# Patient Record
Sex: FEMALE | Race: WHITE
[De-identification: names, ages, dates, MRNs, and addresses within clinical notes are randomized per-mention and may not be internally consistent; named-entity substitution may affect disease eponyms.]

---

## 2017-01-13 ENCOUNTER — HOSPITAL ENCOUNTER (OUTPATIENT)
Dept: HOSPITAL 47 - LABWHC1 | Age: 22
Discharge: HOME | End: 2017-01-13
Payer: COMMERCIAL

## 2017-01-13 DIAGNOSIS — O26.811: Primary | ICD-10-CM

## 2017-01-13 DIAGNOSIS — Z3A.00: ICD-10-CM

## 2017-01-13 LAB
CH: 32.1
CHCM: 36
ERYTHROCYTE [DISTWIDTH] IN BLOOD BY AUTOMATED COUNT: 3.78 M/UL (ref 3.8–5.4)
ERYTHROCYTE [DISTWIDTH] IN BLOOD: 12.4 % (ref 11.5–15.5)
GLUCOSE SERPL-MCNC: 91 MG/DL (ref 74–99)
HCT VFR BLD AUTO: 33.9 % (ref 34–46)
HDW: 2.6
HEPATITIS B SURFACE AG INDEX: 0.06
HGB BLD-MCNC: 11.8 GM/DL (ref 11.4–16)
MCH RBC QN AUTO: 31.1 PG (ref 25–35)
MCHC RBC AUTO-ENTMCNC: 34.8 G/DL (ref 31–37)
MCV RBC AUTO: 89.5 FL (ref 80–100)
NON-AFRICAN AMERICAN GFR(MDRD): >60
WBC # BLD AUTO: 11.6 K/UL (ref 3.8–10.6)

## 2017-01-13 PROCEDURE — 87340 HEPATITIS B SURFACE AG IA: CPT

## 2017-01-13 PROCEDURE — 86762 RUBELLA ANTIBODY: CPT

## 2017-01-13 PROCEDURE — 86778 TOXOPLASMA ANTIBODY IGM: CPT

## 2017-01-13 PROCEDURE — 86850 RBC ANTIBODY SCREEN: CPT

## 2017-01-13 PROCEDURE — 86780 TREPONEMA PALLIDUM: CPT

## 2017-01-13 PROCEDURE — 86696 HERPES SIMPLEX TYPE 2 TEST: CPT

## 2017-01-13 PROCEDURE — 82947 ASSAY GLUCOSE BLOOD QUANT: CPT

## 2017-01-13 PROCEDURE — 36415 COLL VENOUS BLD VENIPUNCTURE: CPT

## 2017-01-13 PROCEDURE — 86901 BLOOD TYPING SEROLOGIC RH(D): CPT

## 2017-01-13 PROCEDURE — 86694 HERPES SIMPLEX NES ANTBDY: CPT

## 2017-01-13 PROCEDURE — 86695 HERPES SIMPLEX TYPE 1 TEST: CPT

## 2017-01-13 PROCEDURE — 86777 TOXOPLASMA ANTIBODY: CPT

## 2017-01-13 PROCEDURE — 85027 COMPLETE CBC AUTOMATED: CPT

## 2017-01-13 PROCEDURE — 86900 BLOOD TYPING SEROLOGIC ABO: CPT

## 2017-01-13 PROCEDURE — 87389 HIV-1 AG W/HIV-1&-2 AB AG IA: CPT

## 2017-01-13 PROCEDURE — 82565 ASSAY OF CREATININE: CPT

## 2017-01-14 LAB
HIV-1/HIV-2 AB SCREEN: (no result)
HIV1D: (no result)
HIV2D: (no result)

## 2017-03-07 ENCOUNTER — HOSPITAL ENCOUNTER (OUTPATIENT)
Dept: HOSPITAL 47 - RADUSWWP | Age: 22
Discharge: HOME | End: 2017-03-07
Payer: COMMERCIAL

## 2017-03-07 DIAGNOSIS — Z3A.19: ICD-10-CM

## 2017-03-07 DIAGNOSIS — O36.62X0: Primary | ICD-10-CM

## 2017-03-07 PROCEDURE — 76811 OB US DETAILED SNGL FETUS: CPT

## 2017-03-08 NOTE — US
EXAMINATION TYPE: US OB fetal anatomy transabd

 

DATE OF EXAM: 3/7/2017 9:35 AM

 

COMPARISON: NONE

 

HISTORY: 036.62XO 2nd tri large for dates

 

TECHNIQUE:

 

EXAM MEASUREMENTS:

 

GESTATIONAL AGE / DATING

Physician Established: (19 weeks/4 days)

** EDC:  7/28/2017

Dates by LMP: Unknown

Dates by First Scan:  Not available

Dates by Current Scan for:  (19 weeks/3 days)

** EDC: 7/29/17

 

FETAL SURVEY

IUP:  Single

PLACENTA: Anterior     

PREVIA: No previa   

** TIANA: 12.20 cm

CERVICAL LENGTH (transabdominal: norm > 3.0cm): 4.8 cm

 

FETAL BIOMETRY

PRESENTATION:   Vertex

FETAL LIE:  Transverse with head maternal Left

BPD: 4.5 cm

**  19 weeks / 5 days

HC: 16.4 cm

**  19 weeks / 1 days

AC: 13.9 cm

**  19 weeks / 3 days

FL: 3.0 cm

**  19 weeks / 2 days

ESTIMATED FETAL WEIGHT IN GRAMS:  284 grams

ESTIMATED FETAL WEIGHT IN LBS/OZS:  0 lbs. 10 oz. 

WEIGHT PERCENTAGE BASED ON ESTABLISHED DATE:  30 %

** HC/AC:  1.2

** FL/AC:   22.

HEART RATE:  144 bpm 

RHYTHM: Normal

 

ANATOMY SEEN (within normal limits): 

* Lateral Vent (< 1 cm) 0.8 cm

* Cisterna Magna (< 1.1 cm) 0.3 cm

* Nuchal Fold (< 0.6 cm) 0.4 cm

* Cerebellum (varies with age) 1.6 cm

Choroid Plexus (bilateral)

Midline Falx 

Cavus Septi Pellucidi   

Four Chamber Heart

Outflow tracts:  LVOT/RVOT

Stomach

Situs

Nose / Lips 

Diaphragm 

Kidneys (bilateral) - prominent renal pelvis 3 mm

Bladder

Cord Insert 

Three Vessel Cord 

Longitudinal Spine 

Transverse Spine 

Arms (bilateral)

Legs (bilateral)

 

 

TECHNOLOGIST IMPRESSION:  Difficult to obtain head structures due to fetal position. 

 

 

 

IMPRESSION:

 

1. Single intrauterine gestation estimated at 19 weeks 3 days gestation. This would've a calculated E
DC of 7/29/2017 based on current measurements.Correlate this with her physician established EDC.

2. Cardiac activity measuring 144 bpm was observed during the study.

## 2017-04-21 ENCOUNTER — HOSPITAL ENCOUNTER (OUTPATIENT)
Dept: HOSPITAL 47 - FBPOP | Age: 22
Discharge: HOME | End: 2017-04-21
Payer: COMMERCIAL

## 2017-04-21 ENCOUNTER — HOSPITAL ENCOUNTER (OUTPATIENT)
Dept: HOSPITAL 47 - LABWHC1 | Age: 22
Discharge: HOME | End: 2017-04-21
Payer: COMMERCIAL

## 2017-04-21 VITALS
RESPIRATION RATE: 16 BRPM | TEMPERATURE: 97.2 F | HEART RATE: 88 BPM | SYSTOLIC BLOOD PRESSURE: 117 MMHG | DIASTOLIC BLOOD PRESSURE: 73 MMHG

## 2017-04-21 DIAGNOSIS — O36.8120: Primary | ICD-10-CM

## 2017-04-21 DIAGNOSIS — Z3A.25: ICD-10-CM

## 2017-04-21 DIAGNOSIS — Z34.92: Primary | ICD-10-CM

## 2017-04-21 DIAGNOSIS — Z3A.00: ICD-10-CM

## 2017-04-21 LAB
CH: 33.3
CHCM: 34
ERYTHROCYTE [DISTWIDTH] IN BLOOD BY AUTOMATED COUNT: 3.2 M/UL (ref 3.8–5.4)
ERYTHROCYTE [DISTWIDTH] IN BLOOD: 13 % (ref 11.5–15.5)
HCT VFR BLD AUTO: 31.6 % (ref 34–46)
HDW: 2.83
HGB BLD-MCNC: 10.7 GM/DL (ref 11.4–16)
MCH RBC QN AUTO: 33.4 PG (ref 25–35)
MCHC RBC AUTO-ENTMCNC: 33.8 G/DL (ref 31–37)
MCV RBC AUTO: 98.7 FL (ref 80–100)
WBC # BLD AUTO: 11.4 K/UL (ref 3.8–10.6)

## 2017-04-21 PROCEDURE — 85027 COMPLETE CBC AUTOMATED: CPT

## 2017-04-21 PROCEDURE — 99213 OFFICE O/P EST LOW 20 MIN: CPT

## 2017-04-21 PROCEDURE — 36415 COLL VENOUS BLD VENIPUNCTURE: CPT

## 2017-04-21 NOTE — P.MSEPDOC
Presenting Problems





- Arrival Data


Date of Arrival on Unit: 17


Time of Arrival on Unit: 15:45


Mode of Transport: Ambulatory





- Complaint


OB-Reason for Admission/Chief Complaint: Decreased Fetal Movement


Comment: x 1.5 days





Prenatal Medical History





- Pregnancy Information


: 3


Para: 0


Term: 0


: 0


Abortions: Spontaneous or Elective: 2


Number of Living Children: 0





- Gestational Age


Expected Date of Delivery: 17


Gestational Age by YOSELIN (wks/days): 25 Weeks and 4 Days





- Prenatal History


Sexually Transmitted Diseases: HSV


Comment: last outbreak 1 month ago, treating as indicated





Review of Systems





- Review of Systems


Constitutional: No problems


Breast: No problems


ENT: No problems


Cardiovascular: No problems


Respiratory: No problems


Gastrointestinal: No problems


Genitourinary: No problems


Musculoskeletal: No problems


Neurological: No problems


Skin: No problems





Vital Signs





- Temperature


Temperature: 97.2 F


Temperature Source: Temporal Artery Scan





- Pulse


  ** Right Sitting Brachial


Pulse Rate: 88


Pulse Assessment Method: Automatic Cuff





- Respirations


Respiratory Rate: 16


Oxygen Delivery Method: Room Air


O2 Sat by Pulse Oximetry: 100





- Blood Pressure


  ** Right Arm Sitting


Blood Pressure: 117/73


Blood Pressure Mean: 87


Blood Pressure Source: Automatic Cuff





Medical Screen Scoring (Pre)





- Cervical Exam


Dilation: Exam Deferred


Effacement: Exam Deferred





- Uterine Contractions


Frequency: N/A


Duration: N/A


Intensity: N/A





- Maternal Vital Signs


Maternal Temperature: N/A


Maternal Blood Pressure: N/A


Signs of Preeclampsia: N/A


Maternal Respirations: N/A





- Maternal Trauma


Maternal Trauma: N/A





- Fetal Assessment


Baseline FHR: 145


Fetal Heart Rate - NICHD Category: Category I (Normal) = 0





- Total Score


Total Score (Pre): 0





- Level of Risk


Level of Risk: Low (0-5)





Physician Notification (Post)





- Physician Notified


Physician Notified Date: 17


Physician Notified Time: 16:10


Physician/Practitioner Notified:: Dr Titus


Spoke With: Dr Titus


New Order Received: Yes





Disposition





- Disposition


OB Disposition: Discharge to home, Written follow up instructions reviewed


Discharge Date: 17


Discharge Time: 16:15


I agree with the RN Medical Screening Exam: Yes


Risk & Benefit of care provided described in d/c instruction: Yes


Diagnosis: DECREASED FETAL MOVEMENTS, SECOND TRIMESTER, FETUS 1

## 2017-06-30 ENCOUNTER — HOSPITAL ENCOUNTER (OUTPATIENT)
Dept: HOSPITAL 47 - FBPOP | Age: 22
Setting detail: OBSERVATION
LOS: 1 days | Discharge: HOME | End: 2017-07-01
Payer: COMMERCIAL

## 2017-06-30 ENCOUNTER — HOSPITAL ENCOUNTER (EMERGENCY)
Dept: HOSPITAL 47 - EC | Age: 22
Discharge: HOME | End: 2017-06-30
Payer: COMMERCIAL

## 2017-06-30 VITALS
RESPIRATION RATE: 20 BRPM | HEART RATE: 100 BPM | TEMPERATURE: 98 F | DIASTOLIC BLOOD PRESSURE: 84 MMHG | SYSTOLIC BLOOD PRESSURE: 123 MMHG

## 2017-06-30 VITALS — BODY MASS INDEX: 34.3 KG/M2

## 2017-06-30 DIAGNOSIS — Z86.14: ICD-10-CM
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
## 2017-06-30 DIAGNOSIS — O9A.213: Primary | ICD-10-CM

## 2017-06-30 DIAGNOSIS — Z82.49: ICD-10-CM

## 2017-06-30 DIAGNOSIS — Z3A.35: ICD-10-CM

## 2017-06-30 DIAGNOSIS — Z79.899: ICD-10-CM

## 2017-06-30 DIAGNOSIS — O26.93: ICD-10-CM

## 2017-06-30 DIAGNOSIS — Z04.1: Primary | ICD-10-CM

## 2017-06-30 DIAGNOSIS — Z3A.36: ICD-10-CM
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
## 2017-06-30 DIAGNOSIS — Z81.8: ICD-10-CM

## 2017-06-30 DIAGNOSIS — O99.89: ICD-10-CM

## 2017-06-30 DIAGNOSIS — S40.012A: ICD-10-CM

## 2017-06-30 DIAGNOSIS — Z87.891: ICD-10-CM

## 2017-06-30 DIAGNOSIS — M25.512: ICD-10-CM

## 2017-06-30 DIAGNOSIS — R10.2: ICD-10-CM

## 2017-06-30 DIAGNOSIS — S46.912A: ICD-10-CM

## 2017-06-30 PROCEDURE — 59025 FETAL NON-STRESS TEST: CPT

## 2017-06-30 PROCEDURE — 99213 OFFICE O/P EST LOW 20 MIN: CPT

## 2017-06-30 PROCEDURE — 99283 EMERGENCY DEPT VISIT LOW MDM: CPT
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
## 2017-07-01 VITALS
SYSTOLIC BLOOD PRESSURE: 120 MMHG | HEART RATE: 104 BPM | TEMPERATURE: 98 F | RESPIRATION RATE: 16 BRPM | DIASTOLIC BLOOD PRESSURE: 79 MMHG
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
## 2017-07-26 ENCOUNTER — HOSPITAL ENCOUNTER (OUTPATIENT)
Dept: HOSPITAL 47 - FBPOP | Age: 22
Discharge: HOME | End: 2017-07-26
Payer: COMMERCIAL

## 2017-07-26 VITALS
TEMPERATURE: 98 F | HEART RATE: 90 BPM | SYSTOLIC BLOOD PRESSURE: 122 MMHG | DIASTOLIC BLOOD PRESSURE: 70 MMHG | RESPIRATION RATE: 16 BRPM

## 2017-07-26 DIAGNOSIS — Z3A.39: ICD-10-CM

## 2017-07-26 DIAGNOSIS — O47.1: Primary | ICD-10-CM

## 2017-07-26 PROCEDURE — 84112 EVAL AMNIOTIC FLUID PROTEIN: CPT

## 2017-07-26 PROCEDURE — 99213 OFFICE O/P EST LOW 20 MIN: CPT

## 2017-07-26 PROCEDURE — 59025 FETAL NON-STRESS TEST: CPT

## 2017-07-27 NOTE — P.MSEPDOC
Presenting Problems





- Arrival Data


Date of Arrival on Unit: 17


Time of Arrival on Unit: 13:00


Mode of Transport: Ambulatory





- Complaint


OB-Reason for Admission/Chief Complaint: Rule Out SROM





Prenatal Medical History





- Pregnancy Information


: 3


Para: 0


Term: 0


: 0


Abortions: Spontaneous or Elective: 2


Number of Living Children: 0





- Gestational Age


Expected Date of Delivery: 17


Gestational Age by YOSELIN (wks/days): 39 Weeks and 6 Days





- Prenatal History


Sexually Transmitted Diseases: HSV





Review of Systems





- Review of Systems


Constitutional: No problems


Breast: No problems


ENT: No problems


Cardiovascular: No problems


Respiratory: No problems


Gastrointestinal: No problems


Genitourinary: No problems


Musculoskeletal: No problems


Neurological: No problems


Skin: No problems





Vital Signs





- Temperature


Temperature: 98.0 F


Temperature Source: Oral





- Pulse


  ** Right Sitting Brachial


Pulse Rate: 90


Pulse Assessment Method: Automatic Cuff





- Respirations


Respiratory Rate: 16


Oxygen Delivery Method: Room Air


O2 Sat by Pulse Oximetry: 97





- Blood Pressure


  ** Right Arm Sitting


Blood Pressure: 122/70


Blood Pressure Mean: 87


Blood Pressure Source: Automatic Cuff





Medical Screen Scoring (Pre)





- Cervical Exam


Dilation: 0 cm = 0


Membranes: Intact





- Uterine Contractions


Frequency: N/A


Duration: N/A


Intensity: N/A





- Maternal Vital Signs


Maternal Temperature: N/A


Maternal Blood Pressure: N/A


Signs of Preeclampsia: N/A


Maternal Respirations: N/A





- Maternal Trauma


Maternal Trauma: N/A





- Fetal Assessment


Baseline FHR: 130


Fetal Heart Rate - NICHD Category: Category I (Normal) = 0


NST: Reactive


Fetal Position: N/A


Fetal Station: N/A





- Total Score


Total Score (Pre): 0





Medical Screen Scoring (Post)





- Cervical Exam


Dilation: 0 cm = 0


Membranes: Intact





- Uterine Contractions


Frequency: > 5 minutes apart = 1


Duration: N/A


Intensity: N/A





- Maternal Vital Signs


Maternal Temperature: N/A


Maternal Blood Pressure: N/A


Signs of Preeclampsia: N/A


Maternal Respirations: N/A





- Maternal Trauma


Maternal Trauma: N/A





- Fetal Assessment


Fetal Heart Rate: 130


Fetal Heart Rate - NICHD Category: Category I (Normal) = 0


NST: Reactive





- Total Score


Total Score (Post): 1





- Post Treatment Level of Risk


Post Treatment Level of Risk: Low (0-5)





Physician Notification (Post)





- Physician Notified


Physician Notified Date: 17


Physician Notified Time: 13:30


Physician/Practitioner Notified:: Dr Saeed


Spoke With: Dr Saeed


New Order Received: Yes





- Notification Comment


Comment: Discharge home. Follow up with Dr Saeed as scheduled.





Disposition





- Disposition


OB Disposition: Discharge to home


Discharge Date: 17


Discharge Time: 13:30


I agree with the RN Medical Screening Exam: Yes


Risk & Benefit of care provided described in d/c instruction: Yes


Diagnosis: FALSE LABOR AT OR AFTER 37 COMPLETED WEEKS OF GESTATION

## 2017-08-02 ENCOUNTER — HOSPITAL ENCOUNTER (OUTPATIENT)
Dept: HOSPITAL 47 - FBPOP | Age: 22
Discharge: HOME | End: 2017-08-02
Payer: COMMERCIAL

## 2017-08-02 VITALS
HEART RATE: 86 BPM | DIASTOLIC BLOOD PRESSURE: 78 MMHG | TEMPERATURE: 97.4 F | SYSTOLIC BLOOD PRESSURE: 122 MMHG | RESPIRATION RATE: 20 BRPM

## 2017-08-02 DIAGNOSIS — Z3A.41: ICD-10-CM

## 2017-08-02 DIAGNOSIS — O48.0: Primary | ICD-10-CM

## 2017-08-02 PROCEDURE — 59025 FETAL NON-STRESS TEST: CPT

## 2017-08-02 PROCEDURE — 99213 OFFICE O/P EST LOW 20 MIN: CPT

## 2017-08-03 ENCOUNTER — HOSPITAL ENCOUNTER (INPATIENT)
Dept: HOSPITAL 47 - 4FBP | Age: 22
LOS: 3 days | Discharge: HOME | End: 2017-08-06
Payer: COMMERCIAL

## 2017-08-03 VITALS — BODY MASS INDEX: 68.1 KG/M2

## 2017-08-03 VITALS — RESPIRATION RATE: 16 BRPM

## 2017-08-03 DIAGNOSIS — Z3A.40: ICD-10-CM

## 2017-08-03 DIAGNOSIS — Z79.899: ICD-10-CM

## 2017-08-03 DIAGNOSIS — A60.04: ICD-10-CM

## 2017-08-03 DIAGNOSIS — O98.311: ICD-10-CM

## 2017-08-03 DIAGNOSIS — O33.9: Primary | ICD-10-CM

## 2017-08-03 LAB
BASOPHILS # BLD AUTO: 0 K/UL (ref 0–0.2)
BASOPHILS NFR BLD AUTO: 0 %
CH: 30.5
CHCM: 34
EOSINOPHIL # BLD AUTO: 0.1 K/UL (ref 0–0.7)
EOSINOPHIL NFR BLD AUTO: 1 %
ERYTHROCYTE [DISTWIDTH] IN BLOOD BY AUTOMATED COUNT: 3.62 M/UL (ref 3.8–5.4)
ERYTHROCYTE [DISTWIDTH] IN BLOOD: 14.1 % (ref 11.5–15.5)
HCT VFR BLD AUTO: 32.6 % (ref 34–46)
HDW: 3
HGB BLD-MCNC: 11.1 GM/DL (ref 11.4–16)
LUC NFR BLD AUTO: 3 %
LYMPHOCYTES # SPEC AUTO: 1.9 K/UL (ref 1–4.8)
LYMPHOCYTES NFR SPEC AUTO: 16 %
MCH RBC QN AUTO: 30.7 PG (ref 25–35)
MCHC RBC AUTO-ENTMCNC: 34.1 G/DL (ref 31–37)
MCV RBC AUTO: 90 FL (ref 80–100)
MONOCYTES # BLD AUTO: 0.8 K/UL (ref 0–1)
MONOCYTES NFR BLD AUTO: 7 %
NEUTROPHILS # BLD AUTO: 9 K/UL (ref 1.3–7.7)
NEUTROPHILS NFR BLD AUTO: 74 %
WBC # BLD AUTO: 0.32 10*3/UL
WBC # BLD AUTO: 12.3 K/UL (ref 3.8–10.6)
WBC (PEROX): 11.96

## 2017-08-03 PROCEDURE — 86850 RBC ANTIBODY SCREEN: CPT

## 2017-08-03 PROCEDURE — 10907ZC DRAINAGE OF AMNIOTIC FLUID, THERAPEUTIC FROM PRODUCTS OF CONCEPTION, VIA NATURAL OR ARTIFICIAL OPENING: ICD-10-PCS

## 2017-08-03 PROCEDURE — 86901 BLOOD TYPING SEROLOGIC RH(D): CPT

## 2017-08-03 PROCEDURE — 88307 TISSUE EXAM BY PATHOLOGIST: CPT

## 2017-08-03 PROCEDURE — 85025 COMPLETE CBC W/AUTO DIFF WBC: CPT

## 2017-08-03 PROCEDURE — 3E033VJ INTRODUCTION OF OTHER HORMONE INTO PERIPHERAL VEIN, PERCUTANEOUS APPROACH: ICD-10-PCS

## 2017-08-03 PROCEDURE — 86900 BLOOD TYPING SEROLOGIC ABO: CPT

## 2017-08-03 RX ADMIN — POTASSIUM CHLORIDE SCH MLS/HR: 14.9 INJECTION, SOLUTION INTRAVENOUS at 07:19

## 2017-08-03 RX ADMIN — POTASSIUM CHLORIDE SCH MLS/HR: 14.9 INJECTION, SOLUTION INTRAVENOUS at 15:17

## 2017-08-03 RX ADMIN — BUTORPHANOL TARTRATE PRN MG: 1 INJECTION, SOLUTION INTRAMUSCULAR; INTRAVENOUS at 20:22

## 2017-08-03 RX ADMIN — POTASSIUM CHLORIDE SCH MLS/HR: 14.9 INJECTION, SOLUTION INTRAVENOUS at 22:29

## 2017-08-03 RX ADMIN — BUTORPHANOL TARTRATE PRN MG: 1 INJECTION, SOLUTION INTRAMUSCULAR; INTRAVENOUS at 22:18

## 2017-08-04 RX ADMIN — POTASSIUM CHLORIDE SCH: 14.9 INJECTION, SOLUTION INTRAVENOUS at 17:39

## 2017-08-04 RX ADMIN — POTASSIUM CHLORIDE SCH MLS/HR: 14.9 INJECTION, SOLUTION INTRAVENOUS at 04:00

## 2017-08-04 RX ADMIN — KETOROLAC TROMETHAMINE PRN MG: 30 INJECTION, SOLUTION INTRAMUSCULAR at 21:07

## 2017-08-04 RX ADMIN — KETOROLAC TROMETHAMINE PRN MG: 30 INJECTION, SOLUTION INTRAMUSCULAR at 14:09

## 2017-08-04 RX ADMIN — POTASSIUM CHLORIDE SCH: 14.9 INJECTION, SOLUTION INTRAVENOUS at 16:44

## 2017-08-04 RX ADMIN — ACETAMINOPHEN AND CODEINE PHOSPHATE PRN EACH: 300; 30 TABLET ORAL at 19:48

## 2017-08-04 RX ADMIN — KETOROLAC TROMETHAMINE PRN MG: 30 INJECTION, SOLUTION INTRAMUSCULAR at 07:44

## 2017-08-04 RX ADMIN — DOCUSATE SODIUM AND SENNOSIDES SCH EACH: 50; 8.6 TABLET ORAL at 14:10

## 2017-08-04 RX ADMIN — DOCUSATE SODIUM AND SENNOSIDES SCH: 50; 8.6 TABLET ORAL at 08:27

## 2017-08-04 NOTE — P.OP
Date of Procedure: 17


Preoperative Diagnosis: 


Intrauterine pregnancy term: Arrest of dilatation


Postoperative Diagnosis: 


Same with cephalopelvic disproportion


Procedure(s) Performed: 


Primary low transverse  section


Implants: 





Anesthesia: spinal


Surgeon: Bonifacio Saeed


Assistant #1: Nikole Cohen


Estimated Blood Loss (ml): 600


IV fluids (ml): 600


Urine output (ml): 100


Pathology: other (Placenta)


Condition: stable


Disposition: floor


Indications for Procedure: 





Operative Findings: 


Female  Apgar was 8 and 9 at one and 5 minutes respectively weight was 7 

lbs. 9 oz.


Description of Procedure: 


Patient was taken to the operating suite where a spinal anesthetic was found be 

adequate.  She was prepped and draped in the normal sterile fashion and placed 

in dorsal supine position with leftward tilt.  Initially a Pfannenstiel skin 

incision was made and this incision was then carried through to underlying 

layer of the fascia was second knife.  Fascia was then nicked in the midline 

and this opening was extended laterally with Hou scissors.  Superior and 

inferior aspect of this incision were then grasped tented up and bluntly and 

sharply dissected off the rectus muscles.  Rectus muscles were then divided the 

midline and blunt dissection through the peritoneum was made.  This opening was 

then extended superiorly and inferiorly with good visualization of both bowel 

bladder.  Bladder blade was then placed and the bladder flap was entered with 

Metzenbaum scissors and digitally created.  Knife was then used to incise 

uterus this opening was then fully crit with a hemostat and bluntly extended.  

Head was then atraumatically delivered and mouth nares were bulb suctioned.  

Anterior and posterior shoulders were delivered gentle downward upper traction 

followed by the remainder the baby.  Umbilical cord was then clamped cut usual 

fashion an nursery personnel was present to assume care.  Placenta was then 

delivered intact and Pitocin was added to the IV.  Uterus was then exteriorized 

cleared of clots and debris and closed in 2 layers with 0 Vicryl suture.  Once 

excellent hemostasis was obtained 0 Vicryl suture was used to close the 

peritoneum.  Fascial layer was then closed with 0 Vicryl suture one layer of 3-

0 Vicryl was placed in the deep subcuticular tissues to reapproximate the skin 

the skin was then closed with 3-0 Vicryl on a John needle.  Sponge, lap, 

needle counts were all correct 2.  Patient was then taken to the recovery room 

in stable and satisfactory condition.

## 2017-08-04 NOTE — P.HPOB
History of Present Illness


H&P Date: 17


Chief Complaint: Intrauterine pregnancy at term: Induction of labor





Teresa is a 22-year-old  at 40 weeks 6 days gestation arise for induction 

of labor.  Her pregnancy course has been significant for history of HSV but has 

had no outbreaks recently.  She has no signs or symptoms of HSV at this time 

and no prodromal symptoms.  On exam there is no lesions on her perineum.  

Otherwise her Precis course was unremarkable.  Fetal heart tones are in the 

140s and reactive.  She was dilated to 160% effaced -2 station artificial 

rupture membranes was performed and clear fluid is noted.  On physical exam 

vital signs are stable and afebrile.  Heart regular, lungs clear, extremities 

without pain.  Osteopathic exam is unremarkable.  Abdomen is soft positive 

bowel sounds are noted and gravid uterus is noted.  Assessment intrauterine 

pregnancy at term.  Plan expect spontaneous vaginal delivery.





Past Medical History


Past Medical History: No Reported History


History of Any Multi-Drug Resistant Organisms: None Reported, MRSA


Date of last positivie culture/infection: 


MDRO Source:: patient


Past Surgical History: No Surgical Hx Reported


Additional Past Surgical History / Comment(s): D&C


Past Anesthesia/Blood Transfusion Reactions: No Reported Reaction


Past Psychological History: No Psychological Hx Reported


Smoking Status: Never smoker


Past Alcohol Use History: None Reported


Past Drug Use History: None Reported





- Past Family History


  ** Mother


Family Medical History: Hyperlipidemia, Hypertension


Additional Family Medical History / Comment(s): polycystic ovarian syndrome, 

scholeosis, joint issues





  ** Father


Additional Family Medical History / Comment(s): appendicitis, depression





Medications and Allergies


 Home Medications











 Medication  Instructions  Recorded  Confirmed  Type


 


Pnv,Calcium 72/Iron/Folic Acid 1 tab PO DAILY 08/10/16 08/03/17 History





[Prenatal Plus Tablet]    


 


Acyclovir 400 mg PO BID 17 History











 Allergies











Allergy/AdvReac Type Severity Reaction Status Date / Time


 


No Known Allergies Allergy   Verified 17 07:04














Exam


Osteopathic Statement: *.  No significant issues noted on an osteopathic 

structural exam other than those noted in the History and Physical/Consult.





- Vital Signs


Vital signs: 


 Vital Signs











  Temp Pulse Resp BP Pulse Ox


 


 17 07:23  97.2 F L  109 H  16  127/75  98








 Intake and Output











 17





 14:59 22:59 06:59


 


Other:   


 


  Weight 180 kg  








 Patient Weight











 17





 06:59


 


Weight 180 kg














Results


Result Diagrams: 


 17 07:17





 Abnormal Lab Results - Last 24 Hours (Table)











  17 Range/Units





  07:17 


 


WBC  12.3 H  (3.8-10.6)  k/uL


 


RBC  3.62 L  (3.80-5.40)  m/uL


 


Hgb  11.1 L  (11.4-16.0)  gm/dL


 


Hct  32.6 L  (34.0-46.0)  %


 


Neutrophils #  9.0 H  (1.3-7.7)  k/uL

## 2017-08-04 NOTE — P.PNOBGPC
Subjective





- Subjective


Principal diagnosis: Postop day 1


Interval history: 





Overall Teresa is doing very well postop day 1.  She is ambulating and she is 

tolerating her diet as far.  She is not voided yet but feels like she has to 

and we'll try again soon.  We'll plan to continue current care for now with 

expectation for discharge in the next 1-2 days.  Vital signs are otherwise 

stable and afebrile.  Heart regular, lungs clear, extremities without pain.  

Abdomen soft and nontender positive bowel sounds are noted her incision is 

clean dry and intact.  Assessment postop day 1.  Plan continue current care.


Patient reports: Reports appetite normal, Reports voiding normally, Reports 

pain well controlled, Reports ambulating normally


[place]: doing well





Objective





- Vital Signs


Latest vital signs: 


 Vital Signs











  Temp Pulse Resp BP Pulse Ox


 


 17 08:00  98.3 F  87  16  118/71  96


 


 17 04:44      96


 


 17 04:00  98.9 F  98  16  132/74  96


 


 17 02:44    16   97


 


 17 02:09  98.5 F  96  16  120/71  96


 


 17 01:39   100  16  121/74  96


 


 17 01:09   93  16  123/74 


 


 17 00:54   98  16  123/64  96


 


 17 00:39   103 H  16  127/61  97


 


 17 00:28    16   97


 


 17 00:24   100  16  122/58 


 


 17 00:09  97.1 F L  104 H  16  92/50 








 Intake and Output











 17





 22:59 06:59 14:59


 


Intake Total 1000 900 


 


Output Total  600 650


 


Balance 1000 300 -650


 


Intake:   


 


  Intake, IV Titration 1000 900 





  Amount   


 


    Lactated Ringers 1,000 ml 1000  





    @ 125 mls/hr IV .Q8H CIRO   





    Rx#:[id number]   


 


    Oxytocin 20 Units/1000 ml  900 





    Ns 1,000 ml @ 1   





    MILLIUNIT/MIN 3 mls/hr IV   





    .Q24H CIRO Rx#:[id number]   


 


Output:   


 


  Urine   650


 


  Estimated Blood Loss  600

## 2017-08-05 LAB
BASOPHILS # BLD AUTO: 0.1 K/UL (ref 0–0.2)
BASOPHILS NFR BLD AUTO: 0 %
CH: 30.6
CHCM: 33.4
EOSINOPHIL # BLD AUTO: 0.1 K/UL (ref 0–0.7)
EOSINOPHIL NFR BLD AUTO: 1 %
ERYTHROCYTE [DISTWIDTH] IN BLOOD BY AUTOMATED COUNT: 3.24 M/UL (ref 3.8–5.4)
ERYTHROCYTE [DISTWIDTH] IN BLOOD: 14.6 % (ref 11.5–15.5)
HCT VFR BLD AUTO: 29.8 % (ref 34–46)
HDW: 2.78
HGB BLD-MCNC: 9.6 GM/DL (ref 11.4–16)
LUC NFR BLD AUTO: 2 %
LYMPHOCYTES # SPEC AUTO: 1.9 K/UL (ref 1–4.8)
LYMPHOCYTES NFR SPEC AUTO: 15 %
MCH RBC QN AUTO: 29.8 PG (ref 25–35)
MCHC RBC AUTO-ENTMCNC: 32.3 G/DL (ref 31–37)
MCV RBC AUTO: 92.2 FL (ref 80–100)
MONOCYTES # BLD AUTO: 1.1 K/UL (ref 0–1)
MONOCYTES NFR BLD AUTO: 9 %
NEUTROPHILS # BLD AUTO: 9.1 K/UL (ref 1.3–7.7)
NEUTROPHILS NFR BLD AUTO: 73 %
WBC # BLD AUTO: 0.28 10*3/UL
WBC # BLD AUTO: 12.6 K/UL (ref 3.8–10.6)
WBC (PEROX): 13.24

## 2017-08-05 RX ADMIN — IBUPROFEN PRN MG: 600 TABLET ORAL at 20:51

## 2017-08-05 RX ADMIN — DOCUSATE SODIUM AND SENNOSIDES SCH EACH: 50; 8.6 TABLET ORAL at 20:50

## 2017-08-05 RX ADMIN — IBUPROFEN PRN MG: 600 TABLET ORAL at 15:15

## 2017-08-05 RX ADMIN — IBUPROFEN PRN MG: 600 TABLET ORAL at 08:55

## 2017-08-05 RX ADMIN — ACETAMINOPHEN AND CODEINE PHOSPHATE PRN EACH: 300; 30 TABLET ORAL at 17:50

## 2017-08-05 RX ADMIN — ACETAMINOPHEN AND CODEINE PHOSPHATE PRN EACH: 300; 30 TABLET ORAL at 06:31

## 2017-08-05 RX ADMIN — ACETAMINOPHEN AND CODEINE PHOSPHATE PRN EACH: 300; 30 TABLET ORAL at 23:55

## 2017-08-05 RX ADMIN — ACETAMINOPHEN AND CODEINE PHOSPHATE PRN EACH: 300; 30 TABLET ORAL at 11:57

## 2017-08-05 RX ADMIN — DOCUSATE SODIUM AND SENNOSIDES SCH EACH: 50; 8.6 TABLET ORAL at 07:33

## 2017-08-05 NOTE — P.PN
Progress Note - Text





Date: Time:1214





Patient is status post . Patient seen this morning with VAS score of 

4.no c/o of pruritus, no c/o nausea/vomiting, comfortable and doing well.

## 2017-08-05 NOTE — P.PNOBGPC
Subjective





- Subjective


Principal diagnosis: Status post  section postoperative day #2


Interval history: 





Patient is sore this morning but may have got behind on her pain medication.  

She has been ambulating and passing flatus but no bowel movement yet.  She is 

working on breast-feeding.  Lochia is decreasing.


Patient reports: Reports appetite normal, Reports voiding normally, Reports 

pain poorly controlled, Reports ambulating normally


[place]: doing well, nursing well





Objective





- Vital Signs


Latest vital signs: 


 Vital Signs











  Temp Pulse Resp BP Pulse Ox


 


 17 00:00  98.5 F  81  16  98/78  97


 


 17 20:00  98.7 F  92  16  117/66  98


 


 17 16:00  98.2 F  102 H  16  120/61  97


 


 17 12:00  98.3 F  88  16  115/76  98


 


 17 08:00  98.3 F  87  16  118/71  96








 Intake and Output











 17





 14:59 22:59 06:59


 


Output Total 650 1450 


 


Balance -650 -1450 


 


Output:   


 


  Urine 650 1450 


 


Other:   


 


  # Voids 0 1 














- Exam


Extremities: Present: normal.  Absent: tenderness, edema


Abdomen: Present: normal appearance, soft (Positive bowel sounds 4), 

tenderness (Mild).  Absent: distention


Incision: Present: normal, dry, intact.  Absent: erythematous





- Labs


Labs: 


 Abnormal Lab Results - Last 24 Hours (Table)











  17 Range/Units





  05:21 


 


WBC  12.6 H  (3.8-10.6)  k/uL


 


RBC  3.24 L  (3.80-5.40)  m/uL


 


Hgb  9.6 L D  (11.4-16.0)  gm/dL


 


Hct  29.8 L  (34.0-46.0)  %


 


Neutrophils #  9.1 H  (1.3-7.7)  k/uL


 


Monocytes #  1.1 H  (0-1.0)  k/uL














Assessment and Plan


(1)  delivery delivered


Narrative/Plan: 


Impression is status post  delivery postoperative day #2.  Plan is to 

keep up with pain medication today.  Will continue ambulating.  Will continue 

postpartum care.


Current Visit: Yes   Status: Acute   Code(s): O82 - ENCOUNTER FOR  

DELIVERY WITHOUT INDICATION   SNOMED Code(s): 565384932

## 2017-08-06 VITALS — SYSTOLIC BLOOD PRESSURE: 124 MMHG | DIASTOLIC BLOOD PRESSURE: 74 MMHG | TEMPERATURE: 99.2 F | HEART RATE: 91 BPM

## 2017-08-06 RX ADMIN — IBUPROFEN PRN MG: 600 TABLET ORAL at 03:03

## 2017-08-06 RX ADMIN — ACETAMINOPHEN AND CODEINE PHOSPHATE PRN EACH: 300; 30 TABLET ORAL at 08:03

## 2017-08-06 RX ADMIN — IBUPROFEN PRN MG: 600 TABLET ORAL at 10:45

## 2017-08-06 RX ADMIN — DOCUSATE SODIUM AND SENNOSIDES SCH: 50; 8.6 TABLET ORAL at 08:04

## 2017-08-06 NOTE — P.DS
Providers


Date of admission: 


17 06:54





Expected date of discharge: 17


Attending physician: 


Bonifacio Saeed





Primary care physician: 


Stated None








- Discharge Diagnosis(es)


(1)  delivery delivered


Current Visit: Yes   Status: Acute   


Hospital Course: 





This is a 22-year-old female  3 para 0 at 40-6/7 weeks who presented for 

induction of labor.  She underwent a primary low transverse  section 

for failure to progress on 2017 and delivered a viable female infant with 

Apgar scores of 8 at 1 minute and 9 at 5 minutes and infant weight of 7 lbs. 9 

oz.  Her postoperative course has been essentially uncomplicated.  She is 

currently postoperative day #3 and is passing flatus and bowel movement.  Her 

pain is fairly well controlled with ibuprofen and Tylenol 3.  She is having 

some difficulty with breast-feeding and has switched to bottle feeding.  Vital 

signs are stable.  Abdomen is soft with fundus firm and nontender.  Incision is 

clean dry and intact.  Extremities show negative Homans.  Impression is status 

post primary low transverse  section postoperative day #3.  Plan is to 

discharge home today.  Routine postoperative and postpartum instructions are 

given.  She is advised to follow up with Dr. Saeed in approximately 7-10 

days.  She is advised to call the office if she has any further questions or 

concerns prior to her appointment time.  She will be given prescriptions for 

ibuprofen and Tylenol 3.  She has a breast pump at home.


Procedures: 





Oxytocin induction of labor


Primary low transverse  section on 2017


Patient Condition at Discharge: Stable





Plan - Discharge Summary


New Discharge Prescriptions: 


New


   Acetaminophen-Codeine 300-30mg [Tylenol #3] 1 tab PO Q4H PRN #30 tablet


     PRN Reason: Pain


   Ibuprofen [Motrin] 600 mg PO Q6HR PRN #30 tab


     PRN Reason: Pain





No Action


   Pnv,Calcium 72/Iron/Folic Acid [Prenatal Plus Tablet] 1 tab PO DAILY


   Acyclovir 400 mg PO BID


Discharge Medication List





Pnv,Calcium 72/Iron/Folic Acid [Prenatal Plus Tablet] 1 tab PO DAILY 08/10/16 [

History]


Acyclovir 400 mg PO BID 17 [History]


Acetaminophen-Codeine 300-30mg [Tylenol #3] 1 tab PO Q4H PRN #30 tablet  [Rx]


Ibuprofen [Motrin] 600 mg PO Q6HR PRN #30 tab 17 [Rx]








Follow up Appointment(s)/Referral(s): 


Bonifacio Saeed DO [Doctor of Osteopathic Medicine] - 1 Week


Activity/Diet/Wound Care/Special Instructions: 


No heavy lifting, limit stairs and driving, and pelvic rest.  If any high 

temperatures, heavy bleeding, or severe pain call my office


Discharge Disposition: HOME SELF-CARE

## 2017-09-25 ENCOUNTER — HOSPITAL ENCOUNTER (OUTPATIENT)
Dept: HOSPITAL 47 - LABWHC1 | Age: 22
Discharge: HOME | End: 2017-09-25
Payer: COMMERCIAL

## 2017-09-25 DIAGNOSIS — Z3A.00: ICD-10-CM

## 2017-09-25 DIAGNOSIS — Z34.90: Primary | ICD-10-CM

## 2017-09-25 PROCEDURE — 84702 CHORIONIC GONADOTROPIN TEST: CPT

## 2017-09-25 PROCEDURE — 36415 COLL VENOUS BLD VENIPUNCTURE: CPT

## 2018-01-12 ENCOUNTER — HOSPITAL ENCOUNTER (EMERGENCY)
Dept: HOSPITAL 47 - EC | Age: 23
Discharge: HOME | End: 2018-01-12
Payer: COMMERCIAL

## 2018-01-12 VITALS
RESPIRATION RATE: 18 BRPM | SYSTOLIC BLOOD PRESSURE: 138 MMHG | HEART RATE: 86 BPM | DIASTOLIC BLOOD PRESSURE: 86 MMHG | TEMPERATURE: 98.8 F
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
## 2018-01-12 DIAGNOSIS — Z86.14: ICD-10-CM

## 2018-01-12 DIAGNOSIS — S62.613A: Primary | ICD-10-CM
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
## 2018-01-12 PROCEDURE — 99283 EMERGENCY DEPT VISIT LOW MDM: CPT

## 2018-01-12 NOTE — ED
Upper Extremity HPI





- General


Chief Complaint: Extremity Injury, Upper


Stated Complaint: Finger Injury


Time Seen by Provider: 18 20:16


Source: patient


Mode of arrival: ambulatory


Limitations: no limitations





- History of Present Illness


Initial Comments: 





This is a 22 year old female with CC of left 3rd finger pain after slamming 

finger into car door. Patient reports she has pain with ROM of finger. Denies 

lacerations. 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 he denies any peripheral 

paresthesias.Patient denies any recent fever, chills, shortness of breath, 

chest pain, back pain, abdominal pain, nausea vomiting, numbness or tingling, 

dysuria or hematuria, constipation or diarrhea, headaches or visual changes, or 

any other current symptoms





- Related Data


 Home Medications











 Medication  Instructions  Recorded  Confirmed


 


No Known Home Medications [No  18





Known Home Medications]   











 Allergies











Allergy/AdvReac Type Severity Reaction Status Date / Time


 


No Known Allergies Allergy   Verified 18 20:11














Review of Systems


ROS Statement: 


Those systems with pertinent positive or pertinent negative responses have been 

documented in the HPI.





ROS Other: All systems not noted in ROS Statement are negative.





Past Medical History


Past Medical History: No Reported History


History of Any Multi-Drug Resistant Organisms: None Reported, MRSA


Date of last positivie culture/infection: 


MDRO Source:: patient


Past Surgical History:  Section


Additional Past Surgical History / Comment(s): D&C


Past Anesthesia/Blood Transfusion Reactions: No Reported Reaction


Past Psychological History: No Psychological Hx Reported


Smoking Status: Never smoker


Past Alcohol Use History: None Reported


Past Drug Use History: None Reported





- Past Family History


  ** Mother


Family Medical History: Hyperlipidemia, Hypertension


Additional Family Medical History / Comment(s): polycystic ovarian syndrome, 

scholeosis, joint issues





  ** Father


Additional Family Medical History / Comment(s): appendicitis, depression





General Exam





- General Exam Comments


Initial Comments: 





this is a well-appearing 22-year-old female.  No distress.


Limitations: no limitations


General appearance: alert, in no apparent distress


Head exam: Present: atraumatic, normocephalic, normal inspection


Eye exam: Present: normal appearance, PERRL, EOMI.  Absent: scleral icterus, 

conjunctival injection, periorbital swelling


ENT exam: Present: normal exam, mucous membranes moist


Neck exam: Present: normal inspection.  Absent: tenderness, meningismus, 

lymphadenopathy


Respiratory exam: Present: normal lung sounds bilaterally.  Absent: respiratory 

distress, wheezes, rales, rhonchi, stridor


Cardiovascular Exam: Present: regular rate, normal rhythm, normal heart sounds.

  Absent: systolic murmur, diastolic murmur, rubs, gallop, clicks


GI/Abdominal exam: Present: soft, normal bowel sounds.  Absent: distended, 

tenderness, guarding, rebound, rigid


Extremities exam: Present: normal inspection, full ROM, normal capillary 

refill.  Absent: tenderness, pedal edema, joint swelling, calf tenderness


  ** Left


Elbow exam: Present: normal inspection, full ROM


Forearm Wrist exam: Present: normal inspection, full ROM


Hand Wrist exam: Present: tenderness, swelling (swelling and tenderness over 

3rd digit. ).  Absent: normal inspection, full ROM (limited ROM due to pain.), 

abrasion, laceration, ecchymosis, deformity, crepitus


Neuro motor exam: Present: wrist extension intact, thumb opposition intact, 

thumb IP flexion intact, thumb adduction intact, fingers 2-5 abduction intact


Vascular: Present: normal capillary refill


Back exam: Present: normal inspection


Neurological exam: Present: alert, oriented X3, CN II-XII intact


Psychiatric exam: Present: normal affect, normal mood


Skin exam: Present: warm, dry, intact, normal color.  Absent: rash





Course


 Vital Signs











  18





  20:02


 


Temperature 98.8 F


 


Pulse Rate 86


 


Respiratory 18





Rate 


 


Blood Pressure 138/86


 


O2 Sat by Pulse 98





Oximetry 














Procedures





- Orthopedic Splinting/Casting


  ** Injury #1


Side: left


Upper Extremity Injury Location: hand


Upper Extremity Immobilizer: aluminum form splint, rod tape





Medical Decision Making





- Medical Decision Making





Patient is a 22-year-old female presents with left third digit pain after hand 

was slammed in a  car door. She reports the pain is mainly over the proximal 

interphalangeal joint. She does have flexion and extension noted within the 

finger.  No evidence of tendon avulsion at this time.patient is no lacerations.

  Patientx-ray reveals a fracture.x-ray report reads, "Patient has ossific 

density medial to proximal interphalangeal joint of the third digit of the left 

hand compatible with a probable displaced chip fracture.  No dislocation.  Soft 

tissue swelling noted." patient was placed in a rod tape, and finger splint.  

Discussed following up with orthopedic hand specialist.  I see a Motrin Tylenol 

for pain. 





- Radiology Data


Radiology results: report reviewed


Patient has ossific density medial to proximal interphalangeal joint of the 

third digit of the left hand compatible with a probable displaced chip 

fracture.  No dislocation.  Soft tissue swelling noted.





Disposition


Clinical Impression: 


 Closed fracture of phalanx of digit of hand





Disposition: HOME SELF-CARE


Condition: Good


Instructions:  Finger Fracture (ED)


Additional Instructions: 


Patient advised to rest, ice the area.  Patient should be wearing this finger 

splint or rod taping the fingers.  follow-up with orthopedic hand 

specialist.Return to the emergency department if any alarming signs or symptoms 

occur.


Referrals: 


Ana Maria Herrera MD [Primary Care Provider] - 1-2 days


Amanuel Hawthorne DO [Doctor of Osteopathic Medicine] - 1-2 days


Time of Disposition: 21:07

## 2018-01-12 NOTE — XR
Left hand

 

HISTORY: Trauma and pain

 

3 views of the left hand

 

No comparisons

 

There is an ossific density present medial to the proximal interphalangeal joint of the third digit o
f the left hand compatible with probable displaced chip fracture, there is no dislocation. There is s
oft tissue swelling.

 

IMPRESSION: Fracture at the third digit as described.

## 2019-10-18 ENCOUNTER — HOSPITAL ENCOUNTER (OUTPATIENT)
Dept: HOSPITAL 47 - LABWHC1 | Age: 24
Discharge: HOME | End: 2019-10-18
Attending: PSYCHIATRY & NEUROLOGY
Payer: COMMERCIAL

## 2019-10-18 DIAGNOSIS — Z51.81: Primary | ICD-10-CM

## 2019-10-18 LAB
ALBUMIN SERPL-MCNC: 5.1 G/DL (ref 3.8–4.9)
ALBUMIN/GLOB SERPL: 2.13 G/DL (ref 1.6–3.17)
ALP SERPL-CCNC: 63 U/L (ref 41–126)
ALT SERPL-CCNC: 20 U/L (ref 8–44)
ANION GAP SERPL CALC-SCNC: 8.4 MMOL/L (ref 4–12)
AST SERPL-CCNC: 21 U/L (ref 13–35)
BASOPHILS # BLD AUTO: 0.1 K/UL (ref 0–0.2)
BASOPHILS NFR BLD AUTO: 1 %
BUN SERPL-SCNC: 9 MG/DL (ref 9–27)
BUN/CREAT SERPL: 11.25 RATIO (ref 12–20)
CALCIUM SPEC-MCNC: 9.7 MG/DL (ref 8.7–10.3)
CHLORIDE SERPL-SCNC: 106 MMOL/L (ref 96–109)
CHOLEST SERPL-MCNC: 186 MG/DL (ref 0–200)
CO2 SERPL-SCNC: 26.6 MMOL/L (ref 21.6–31.8)
EOSINOPHIL # BLD AUTO: 0.1 K/UL (ref 0–0.7)
EOSINOPHIL NFR BLD AUTO: 1 %
ERYTHROCYTE [DISTWIDTH] IN BLOOD BY AUTOMATED COUNT: 4.12 M/UL (ref 3.8–5.4)
ERYTHROCYTE [DISTWIDTH] IN BLOOD: 12.5 % (ref 11.5–15.5)
GLOBULIN SER CALC-MCNC: 2.4 G/DL (ref 1.6–3.3)
GLUCOSE SERPL-MCNC: 96 MG/DL (ref 70–110)
HBA1C MFR BLD: 5.1 % (ref 4–6)
HCT VFR BLD AUTO: 37.8 % (ref 34–46)
HDLC SERPL-MCNC: 56 MG/DL (ref 40–60)
HGB BLD-MCNC: 12.9 GM/DL (ref 11.4–16)
LDLC SERPL CALC-MCNC: 118.6 MG/DL (ref 0–131)
LYMPHOCYTES # SPEC AUTO: 2.4 K/UL (ref 1–4.8)
LYMPHOCYTES NFR SPEC AUTO: 29 %
MCH RBC QN AUTO: 31.3 PG (ref 25–35)
MCHC RBC AUTO-ENTMCNC: 34.2 G/DL (ref 31–37)
MCV RBC AUTO: 91.7 FL (ref 80–100)
MONOCYTES # BLD AUTO: 0.5 K/UL (ref 0–1)
MONOCYTES NFR BLD AUTO: 6 %
NEUTROPHILS # BLD AUTO: 5 K/UL (ref 1.3–7.7)
NEUTROPHILS NFR BLD AUTO: 61 %
PLATELET # BLD AUTO: 302 K/UL (ref 150–450)
POTASSIUM SERPL-SCNC: 4.4 MMOL/L (ref 3.5–5.5)
PROT SERPL-MCNC: 7.5 G/DL (ref 6.2–8.2)
SODIUM SERPL-SCNC: 141 MMOL/L (ref 135–145)
T3RU NFR SERPL: 27 % (ref 23–37)
T4 FREE SERPL-MCNC: 1 NG/DL (ref 0.8–1.8)
TRIGL SERPL-MCNC: 57 MG/DL (ref 0–149)
VLDLC SERPL CALC-MCNC: 11.4 MG/DL (ref 5–40)
WBC # BLD AUTO: 8.3 K/UL (ref 3.8–10.6)

## 2019-10-18 PROCEDURE — 83036 HEMOGLOBIN GLYCOSYLATED A1C: CPT

## 2019-10-18 PROCEDURE — 80061 LIPID PANEL: CPT

## 2019-10-18 PROCEDURE — 84479 ASSAY OF THYROID (T3 OR T4): CPT

## 2019-10-18 PROCEDURE — 84443 ASSAY THYROID STIM HORMONE: CPT

## 2019-10-18 PROCEDURE — 36415 COLL VENOUS BLD VENIPUNCTURE: CPT

## 2019-10-18 PROCEDURE — 84439 ASSAY OF FREE THYROXINE: CPT

## 2019-10-18 PROCEDURE — 82306 VITAMIN D 25 HYDROXY: CPT

## 2019-10-18 PROCEDURE — 80053 COMPREHEN METABOLIC PANEL: CPT

## 2019-10-18 PROCEDURE — 85025 COMPLETE CBC W/AUTO DIFF WBC: CPT

## 2019-10-18 PROCEDURE — 82746 ASSAY OF FOLIC ACID SERUM: CPT

## 2019-10-18 PROCEDURE — 86140 C-REACTIVE PROTEIN: CPT

## 2021-07-19 ENCOUNTER — HOSPITAL ENCOUNTER (OUTPATIENT)
Dept: HOSPITAL 47 - RADXRMAIN | Age: 26
Discharge: HOME | End: 2021-07-19
Attending: NURSE PRACTITIONER
Payer: COMMERCIAL

## 2021-07-19 DIAGNOSIS — J32.9: Primary | ICD-10-CM

## 2021-07-19 PROCEDURE — 70220 X-RAY EXAM OF SINUSES: CPT

## 2021-07-19 NOTE — XR
EXAMINATION TYPE: XR sinus

 

DATE OF EXAM: 7/19/2021

 

COMPARISON: None

 

HISTORY: Chronic sinusitis congestion worse to left side

 

TECHNIQUE: 4 view paranasal sinus study

 

FINDINGS: Paranasal sinuses are examined in 4 views. No suspicious air-fluid levels are evident. No s
ignificant mucosal thickening is evident. The sella is unremarkable.

 

IMPRESSION:

1.  Normal paranasal sinus study